# Patient Record
Sex: FEMALE | Race: OTHER
[De-identification: names, ages, dates, MRNs, and addresses within clinical notes are randomized per-mention and may not be internally consistent; named-entity substitution may affect disease eponyms.]

---

## 2021-01-21 ENCOUNTER — HOSPITAL ENCOUNTER (OUTPATIENT)
Dept: HOSPITAL 61 - KCIC MRI | Age: 61
End: 2021-01-21
Attending: PSYCHIATRY & NEUROLOGY
Payer: SELF-PAY

## 2021-01-21 DIAGNOSIS — M51.16: ICD-10-CM

## 2021-01-21 DIAGNOSIS — M47.26: Primary | ICD-10-CM

## 2021-01-21 DIAGNOSIS — M48.061: ICD-10-CM

## 2021-01-21 PROCEDURE — 72148 MRI LUMBAR SPINE W/O DYE: CPT

## 2021-01-21 NOTE — [facility]
MRI of the lumbar spine without contrast 1/21/2021



Clinical history: Low back pain which radiates down both legs, right greater than left.



TECHNIQUE: Unenhanced T1-weighted and T2-weighted sagittal and axial and inversion recovery sagittal 
images of the lumbar spine were obtained.



FINDINGS: Very mild S-shaped curvature of the thoracolumbar spine is seen. Degenerative signal change
s are seen involving all of the disks of the lumbar spine. Degenerative signal changes are seen withi
n the marrow surrounding these discs. The conus medullaris is normal morphology, position, and signal
 characteristics.



At the L1-2 disc space there is a minimal generalized disc bulge. Superimposed on this disc bulge is 
a focal central disc protrusion. This measures 2 mm in AP diameter. Degenerative changes are seen inv
olving the facet joints bilaterally. These findings do not result in significant central spinal canal
 or neural foraminal stenosis.



At the L2-3 disc space there is a mild to moderate generalized disc bulge. Degenerative changes are s
een involving the facet joints bilaterally. There are small facet joint effusions bilaterally. There 
is mild to moderate ligamentum flavum hypertrophy bilaterally. There is prominence of the posterior e
pidural fat. These findings when combined result in mild to moderate central spinal canal stenosis. N
o neural foraminal stenosis is seen.



At the L3-4 disc space there is a mild to moderate generalized disc bulge. Superimposed on this disc 
bulge is a focal central disc protrusion. This measures 3 mm in AP diameter. Degenerative changes are
 seen involving the facet joints bilaterally. There is moderate ligamentum flavum hypertrophy bilater
ally. There are small facet joint effusions bilaterally. Prominence of the posterior epidural fat is 
seen. These findings when combined result in moderate to severe central spinal canal stenosis, right 
greater than left. No neural foraminal stenosis is seen.



At the L4-5 disc space there is a mild to moderate generalized disc bulge. This is eccentric to the r
ight. Superimposed on this disc bulge is a right paracentral focal disc protrusion. This measures 2 m
m in AP diameter. Degenerative changes are seen involving the facet joints bilaterally. There is mode
rate ligamentum flavum hypertrophy bilaterally. There is prominence of the posterior epidural fat. Th
b[place]he[place] findings when combined result in moderate to severe central spinal canal stenosis. No neural fora
maciel stenosis is seen.



At the L5-S1 disc space there is a mild generalized disc bulge. Superimposed on this disc bulge is a 
central/left paracentral focal disc protrusion. This measures 3 mm in AP diameter. Degenerative candelaria
es are seen involving the facet joints bilaterally. There are small facet joint effusions bilaterally
. Mild to moderate ligamentum flavum hypertrophy is seen bilaterally. These findings when combined do
 not result in significant central spinal canal or neural foraminal stenosis.



IMPRESSION: The changes of degenerative disc disease are seen involving the lumbar spine. These findi
ngs results in mild to moderate central spinal canal stenosis at L2-3, moderate to severe right great
er than left central spinal canal stenosis at L3-4 and moderate to severe central spinal canal stenos
is at L4-5. No neural foraminal stenosis is seen.



Electronically signed by: Ady Shabazz MD (1/21/2021 10:20 AM) [id number]